# Patient Record
Sex: FEMALE | Race: WHITE | NOT HISPANIC OR LATINO | URBAN - METROPOLITAN AREA
[De-identification: names, ages, dates, MRNs, and addresses within clinical notes are randomized per-mention and may not be internally consistent; named-entity substitution may affect disease eponyms.]

---

## 2024-05-22 ENCOUNTER — PREP FOR PROCEDURE (OUTPATIENT)
Age: 34
End: 2024-05-22

## 2024-05-22 ENCOUNTER — TELEPHONE (OUTPATIENT)
Age: 34
End: 2024-05-22

## 2024-05-22 ENCOUNTER — CONSULT (OUTPATIENT)
Age: 34
End: 2024-05-22
Payer: COMMERCIAL

## 2024-05-22 VITALS
HEIGHT: 63 IN | BODY MASS INDEX: 33.73 KG/M2 | DIASTOLIC BLOOD PRESSURE: 80 MMHG | SYSTOLIC BLOOD PRESSURE: 138 MMHG | WEIGHT: 190.4 LBS

## 2024-05-22 DIAGNOSIS — R19.7 DIARRHEA, UNSPECIFIED TYPE: Primary | ICD-10-CM

## 2024-05-22 DIAGNOSIS — F41.9 ANXIETY: ICD-10-CM

## 2024-05-22 DIAGNOSIS — K90.0 CELIAC DISEASE: ICD-10-CM

## 2024-05-22 PROCEDURE — 99203 OFFICE O/P NEW LOW 30 MIN: CPT | Performed by: INTERNAL MEDICINE

## 2024-05-22 RX ORDER — PERFLUOROHEXYLOCTANE 1 MG/MG
SOLUTION OPHTHALMIC
COMMUNITY

## 2024-05-22 RX ORDER — DROSPIRENONE AND ETHINYL ESTRADIOL 0.03MG-3MG
KIT ORAL
COMMUNITY

## 2024-05-22 RX ORDER — ESCITALOPRAM OXALATE 20 MG/1
TABLET, FILM COATED ORAL
COMMUNITY

## 2024-05-22 NOTE — PATIENT INSTRUCTIONS
Joanna Jaramillo 33 y.o. female MRN: [id number]  Encounter: [id number]    ASSESSMENT AND PLAN:      1. Diarrhea, unspecified type  --With a history of chronic diarrhea.  Will have anywhere from 3-5 loose bowel movements per day.  No nocturnal symptoms.  Has significant urgency and crampy abdominal pain.  Does not seem to have IBS.  Seems to have more issues with chronic diarrhea.  Differential diagnosis could include relationship to celiac disease although the patient has been gluten-free for over 10 years.  -Consider microscopic colitis which is associated with celiac disease.  Less likely colon neoplasm or inflammatory bowel disease    - Colonoscopy; Future  ..This procedure has been fully reviewed with the patient and written informed consent has been obtained.     -No harm in the patient taking loperamide 2 mg 2 tablets in the morning.  Can titrate dose.  Probably no adverse effect    2. Celiac disease  --She was diagnosed in 2010 with endoscopic biopsies.  She also had equivocal blood work at that time.  Has not had endoscopy since that time.  She has been very compliant with a gluten-free diet.  PCP is ordered multiple times including a celiac panel    - EGD; Future  ..This procedure has been fully reviewed with the patient and written informed consent has been obtained.     -- Patient's PCP Dr. Bryson has ordered all the appropriate lab work including celiac panel, iron panel folate, CMP CBC thyroid studies and vitamin D level  -May include DEXA scan in the future    3. Anxiety  --Patient on low-dose Lexapro for history of anxiety.  Recent increase in dose but this was only the past week.  Medication has been associated with microscopic colitis      Followup Appointment: 3 mo to 4 mo

## 2024-05-22 NOTE — PROGRESS NOTES
[facility] - Outpatient Consultation  Joanna Jaramillo 33 y.o. female MRN: [id number]  Encounter: [id number]    ASSESSMENT AND PLAN:      1. Diarrhea, unspecified type  --With a history of chronic diarrhea.  Will have anywhere from 3-5 loose bowel movements per day.  No nocturnal symptoms.  Has significant urgency and crampy abdominal pain.  Does not seem to have IBS.  Seems to have more issues with chronic diarrhea.  Differential diagnosis could include relationship to celiac disease although the patient has been gluten-free for over 10 years.  -Consider microscopic colitis which is associated with celiac disease.  Less likely colon neoplasm or inflammatory bowel disease    - Colonoscopy; Future  ..This procedure has been fully reviewed with the patient and written informed consent has been obtained.     -No harm in the patient taking loperamide 2 mg 2 tablets in the morning.  Can titrate dose.  Probably no adverse effect    2. Celiac disease  --She was diagnosed in 2010 with endoscopic biopsies.  She also had equivocal blood work at that time.  Has not had endoscopy since that time.  She has been very compliant with a gluten-free diet.  PCP is ordered multiple times including a celiac panel    - EGD; Future  ..This procedure has been fully reviewed with the patient and written informed consent has been obtained.     -- Patient's PCP Dr. Bryson has ordered all the appropriate lab work including celiac panel, iron panel folate, CMP CBC thyroid studies and vitamin D level  -May include DEXA scan in the future    3. Anxiety  --Patient on low-dose Lexapro for history of anxiety.  Recent increase in dose but this was only the past week.  Medication has been associated with microscopic colitis      Followup Appointment: 3 mo to 4 mo   ______________________________________________________________________    Chief Complaint   Patient presents with    GI Consult     Pt was diagnosed  with Celiac disease 2010, symptoms have increased significantly over last 3 years. Effecting her daily life.       HPI:   Joanna Jaramillo is a 33 y.o. year old female who presents to the office with an ongoing issue with chronic loose stool.  Patient will have anywhere from 3 to 5 loose bowel movements per day.  This has been going on for about 2 years.  Patient has some crampy abdominal pain associated with urgency.  She does not have any blood per rectum or nocturnal symptoms.  Is no recent weight loss.  Recently patient is cut back on coffee and lactose containing foods and this seems to have helped but have not major major impact.  She was diagnosed with celiac disease in 2010 by virtue of abnormal blood work and an upper endoscopy and small bowel biopsy.  Patient has been very strict with respect to following a gluten-free diet.  She has been very compliant and really had no problems for years but then over the last 2 years has developed some increasing problems.  It should be noted there is no family history of colorectal cancer.  There is no history of inflammatory bowel disease.  Patient is not really having any extraintestinal manifestations with respect to her celiac disease at this time.  She does take Lexapro for history of anxiety.  The dose was recently increased.  She has been on this for about 2 to 3 years.      Historical Information   Past Medical History:   Diagnosis Date    Anxiety     Celiac disease Feb 2010     Past Surgical History:   Procedure Laterality Date    EYE SURGERY      UPPER GASTROINTESTINAL ENDOSCOPY  Feb 2010     Social History     Substance and Sexual Activity   Alcohol Use Yes    Alcohol/week: 5.0 standard drinks of alcohol    Types: 5 Standard drinks or equivalent per week     Social History     Substance and Sexual Activity   Drug Use Never     Social History     Tobacco Use   Smoking Status Never    Passive exposure: Never   Smokeless Tobacco Never     Family History  "  Problem Relation Age of Onset    Cancer Mother     Diabetes Father     Breast cancer Maternal Grandmother     Colon cancer Neg Hx     Colon polyps Neg Hx        Meds/Allergies     Current Outpatient Medications:     drospirenone-ethinyl estradiol (Ocella) 3-0.03 MG per tablet    Lexapro 20 MG tablet    Miebo 1.338 GM/ML SOLN    Allergies   Allergen Reactions    Gluten Meal - Food Allergy Other (See Comments)       PHYSICAL EXAM:    Blood pressure 138/80, height 5' 3\" (1.6 m), weight 86.4 kg (190 lb 6.4 oz). Body mass index is 33.73 kg/m².  General Appearance: NAD, cooperative, alert  Eyes: Anicteric, conjunctiva pink   ENT:  Normocephalic, atraumatic, normal mucosa.    Neck:  Supple, symmetrical, trachea midline,   Resp:  Clear to auscultation bilaterally; no rales, rhonchi or wheezing; respirations unlabored   CV:  S1 S2, Regular rate and rhythm; no murmur, rub, or gallop.  GI:  Soft, non-tender, non-distended; normal bowel sounds; no masses, no organomegaly   Rectal: Deferred  Musculoskeletal: No cyanosis, clubbing or edema. Normal ROM.  Skin:  No jaundice, rashes, or lesions   Heme/Lymph: No palpable cervical lymphadenopathy  Psych: Normal affect, good eye contact  Neuro: No gross deficits, AAOx3    Lab Results:   Pending studies includ CBC CMP celiac panel, vitamin D iron levels folate and vitamin B12      REVIEW OF SYSTEMS:    CONSTITUTIONAL: Denies any fever, chills, rigors, and weight loss.  HEENT: No earache or tinnitus. Denies hearing loss or visual disturbances.  CARDIOVASCULAR: No chest pain or palpitations.   RESPIRATORY: Denies any cough, hemoptysis, shortness of breath or dyspnea on exertion.  GASTROINTESTINAL: As noted in the History of Present Illness.   GENITOURINARY: No problems with urination. Denies any hematuria or dysuria.  NEUROLOGIC: No dizziness or vertigo, denies headaches.   MUSCULOSKELETAL: Denies any muscle or joint pain.   SKIN: Denies skin rashes or itching.   ENDOCRINE: Denies " excessive thirst. Denies intolerance to heat or cold.  PSYCHOSOCIAL: Positive for mild anxiety . Denies any recent memory loss.

## 2024-05-22 NOTE — TELEPHONE ENCOUNTER
Scheduled date of combo (as of today): 7/29/2024  Physician performing combo: ROLANDA  Location of combo: [facility]  Bowel prep reviewed with patient: Miralax  Instructions reviewed with patient by: LUIS  Clearances: N

## 2024-07-15 ENCOUNTER — ANESTHESIA EVENT (OUTPATIENT)
Dept: ANESTHESIOLOGY | Facility: AMBULATORY SURGERY CENTER | Age: 34
End: 2024-07-15

## 2024-07-15 ENCOUNTER — ANESTHESIA (OUTPATIENT)
Dept: ANESTHESIOLOGY | Facility: AMBULATORY SURGERY CENTER | Age: 34
End: 2024-07-15

## 2024-07-29 ENCOUNTER — HOSPITAL ENCOUNTER (OUTPATIENT)
Dept: GASTROENTEROLOGY | Facility: AMBULATORY SURGERY CENTER | Age: 34
Discharge: HOME | End: 2024-07-29
Payer: COMMERCIAL

## 2024-07-29 ENCOUNTER — ANESTHESIA (OUTPATIENT)
Dept: GASTROENTEROLOGY | Facility: AMBULATORY SURGERY CENTER | Age: 34
End: 2024-07-29

## 2024-07-29 ENCOUNTER — ANESTHESIA EVENT (OUTPATIENT)
Dept: GASTROENTEROLOGY | Facility: AMBULATORY SURGERY CENTER | Age: 34
End: 2024-07-29

## 2024-07-29 VITALS
TEMPERATURE: 97.8 F | WEIGHT: 190 LBS | SYSTOLIC BLOOD PRESSURE: 121 MMHG | RESPIRATION RATE: 19 BRPM | BODY MASS INDEX: 33.66 KG/M2 | DIASTOLIC BLOOD PRESSURE: 83 MMHG | OXYGEN SATURATION: 99 % | HEART RATE: 70 BPM | HEIGHT: 63 IN

## 2024-07-29 DIAGNOSIS — K90.0 CELIAC DISEASE: ICD-10-CM

## 2024-07-29 DIAGNOSIS — R19.7 DIARRHEA, UNSPECIFIED TYPE: ICD-10-CM

## 2024-07-29 LAB
EXT PREGNANCY TEST URINE: NEGATIVE
EXT. CONTROL: NORMAL

## 2024-07-29 PROCEDURE — 45380 COLONOSCOPY AND BIOPSY: CPT | Performed by: INTERNAL MEDICINE

## 2024-07-29 PROCEDURE — 43239 EGD BIOPSY SINGLE/MULTIPLE: CPT | Performed by: INTERNAL MEDICINE

## 2024-07-29 PROCEDURE — 88305 TISSUE EXAM BY PATHOLOGIST: CPT | Performed by: PATHOLOGY

## 2024-07-29 PROCEDURE — 88342 IMHCHEM/IMCYTCHM 1ST ANTB: CPT | Performed by: PATHOLOGY

## 2024-07-29 RX ORDER — SODIUM CHLORIDE, SODIUM LACTATE, POTASSIUM CHLORIDE, CALCIUM CHLORIDE 600; 310; 30; 20 MG/100ML; MG/100ML; MG/100ML; MG/100ML
50 INJECTION, SOLUTION INTRAVENOUS CONTINUOUS
Status: DISCONTINUED | OUTPATIENT
Start: 2024-07-29 | End: 2024-08-02 | Stop reason: HOSPADM

## 2024-07-29 RX ORDER — PROPOFOL 10 MG/ML
INJECTION, EMULSION INTRAVENOUS AS NEEDED
Status: DISCONTINUED | OUTPATIENT
Start: 2024-07-29 | End: 2024-07-29

## 2024-07-29 RX ORDER — LIDOCAINE HYDROCHLORIDE 10 MG/ML
INJECTION, SOLUTION EPIDURAL; INFILTRATION; INTRACAUDAL; PERINEURAL AS NEEDED
Status: DISCONTINUED | OUTPATIENT
Start: 2024-07-29 | End: 2024-07-29

## 2024-07-29 RX ADMIN — LIDOCAINE HYDROCHLORIDE 50 MG: 10 INJECTION, SOLUTION EPIDURAL; INFILTRATION; INTRACAUDAL; PERINEURAL at 10:44

## 2024-07-29 RX ADMIN — PROPOFOL 50 MG: 10 INJECTION, EMULSION INTRAVENOUS at 10:59

## 2024-07-29 RX ADMIN — PROPOFOL 50 MG: 10 INJECTION, EMULSION INTRAVENOUS at 11:04

## 2024-07-29 RX ADMIN — PROPOFOL 50 MG: 10 INJECTION, EMULSION INTRAVENOUS at 11:07

## 2024-07-29 RX ADMIN — SODIUM CHLORIDE, SODIUM LACTATE, POTASSIUM CHLORIDE, CALCIUM CHLORIDE 50 ML/HR: 600; 310; 30; 20 INJECTION, SOLUTION INTRAVENOUS at 10:17

## 2024-07-29 RX ADMIN — PROPOFOL 50 MG: 10 INJECTION, EMULSION INTRAVENOUS at 10:54

## 2024-07-29 RX ADMIN — PROPOFOL 50 MG: 10 INJECTION, EMULSION INTRAVENOUS at 10:49

## 2024-07-29 RX ADMIN — PROPOFOL 150 MG: 10 INJECTION, EMULSION INTRAVENOUS at 10:44

## 2024-07-29 NOTE — H&P
"History and Physical -  [facility]  Joanna Jaramillo 33 y.o. female MRN: [id number]    HPI: Joanna Jaramillo is a 33 y.o. year old female who presents for EGD and colonoscopy.  Patient does have a history of celiac disease diagnosed over 10 years ago.  Also with intractable loose stools and stool frequency    REVIEW OF SYSTEMS: Per the HPI, and otherwise unremarkable.    Historical Information   Past Medical History:   Diagnosis Date    Anxiety     Celiac disease Feb 2010     Past Surgical History:   Procedure Laterality Date    EYE SURGERY      UPPER GASTROINTESTINAL ENDOSCOPY  Feb 2010     Social History   Social History     Substance and Sexual Activity   Alcohol Use Yes    Alcohol/week: 5.0 standard drinks of alcohol    Types: 5 Standard drinks or equivalent per week     Social History     Substance and Sexual Activity   Drug Use Never     Social History     Tobacco Use   Smoking Status Never    Passive exposure: Never   Smokeless Tobacco Never     Family History   Problem Relation Age of Onset    Cancer Mother     Diabetes Father     Breast cancer Maternal Grandmother     Colon cancer Neg Hx     Colon polyps Neg Hx        Meds/Allergies       Current Outpatient Medications:     drospirenone-ethinyl estradiol (Ocella) 3-0.03 MG per tablet    Miebo 1.338 GM/ML SOLN    Lexapro 20 MG tablet    Current Facility-Administered Medications:     lactated ringers infusion, 50 mL/hr, Intravenous, Continuous, 50 mL/hr at 07/29/24 1017    Allergies   Allergen Reactions    Gluten Meal - Food Allergy GI Intolerance       Objective     /73   Pulse 83   Temp 97.8 °F (36.6 °C) (Temporal)   Resp (!) 23   Ht 5' 3\" (1.6 m)   Wt 86.2 kg (190 lb)   LMP 07/22/2024   SpO2 98%   BMI 33.66 kg/m²     PHYSICAL EXAM    Gen: NAD AAOx3  Head: Normocephalic, Atraumatic  CV: S1S2 RRR no m/r/g  CHEST: Clear b/l no c/r/w  ABD: soft, +BS NT/ND  EXT: no edema    ASSESSMENT/PLAN:  This is a 33 y.o. year old female " here for EGD and colonoscopy, and she is stable and optimized for her procedure.

## 2024-07-29 NOTE — ANESTHESIA PREPROCEDURE EVALUATION
Procedure:  EGD  COLONOSCOPY    Relevant Problems   ANESTHESIA (within normal limits)      NEURO/PSYCH   (+) Anxiety        Physical Exam    Airway    Mallampati score: I  TM Distance: >3 FB  Neck ROM: full     Dental   No notable dental hx     Cardiovascular  Cardiovascular exam normal    Pulmonary  Pulmonary exam normal     Other Findings  post-pubertal.      Anesthesia Plan  ASA Score- 2     Anesthesia Type- IV sedation with anesthesia with ASA Monitors.         Additional Monitors:     Airway Plan:     Comment: I discussed risks (reviewed with patient on the anesthesia consent form), benefits and alternatives of monitored sedation including the possibility under sedation to have recall or mild discomfort.  .       Plan Factors-    Chart reviewed.    Patient summary reviewed.                  Induction- intravenous.    Postoperative Plan-         Informed Consent- Anesthetic plan and risks discussed with patient.  I personally reviewed this patient with the CRNA. Discussed and agreed on the Anesthesia Plan with the CRNA..

## 2024-07-29 NOTE — ANESTHESIA POSTPROCEDURE EVALUATION
Post-Op Assessment Note    CV Status:  Stable  Pain Score: 0    Pain management: adequate       Mental Status:  Alert and awake   Hydration Status:  Euvolemic   PONV Controlled:  None   Airway Patency:  Patent     Post Op Vitals Reviewed: Yes    No anethesia notable event occurred.    Staff: Anesthesiologist, CRNA               BP   103/63   Temp      Pulse  65   Resp   16   SpO2   96

## 2024-08-03 NOTE — RESULT ENCOUNTER NOTE
Message left on MyChart.  Patient's endoscopic biopsies EGD and colonoscopy all negative.  Imodium as needed for constipation.  Copy passed to patient's PCP Dr. Bryson

## 2024-09-10 ENCOUNTER — OFFICE VISIT (OUTPATIENT)
Age: 34
End: 2024-09-10
Payer: COMMERCIAL

## 2024-09-10 VITALS
HEIGHT: 63 IN | BODY MASS INDEX: 34.12 KG/M2 | SYSTOLIC BLOOD PRESSURE: 118 MMHG | DIASTOLIC BLOOD PRESSURE: 82 MMHG | WEIGHT: 192.6 LBS

## 2024-09-10 DIAGNOSIS — R19.7 DIARRHEA, UNSPECIFIED TYPE: ICD-10-CM

## 2024-09-10 DIAGNOSIS — K58.0 IRRITABLE BOWEL SYNDROME WITH DIARRHEA: ICD-10-CM

## 2024-09-10 DIAGNOSIS — K90.0 CELIAC DISEASE: Primary | ICD-10-CM

## 2024-09-10 PROCEDURE — 99214 OFFICE O/P EST MOD 30 MIN: CPT | Performed by: INTERNAL MEDICINE

## 2024-09-10 RX ORDER — DICYCLOMINE HYDROCHLORIDE 10 MG/1
10 CAPSULE ORAL
Qty: 90 CAPSULE | Refills: 2 | Status: SHIPPED | OUTPATIENT
Start: 2024-09-10 | End: 2024-12-09

## 2024-09-10 RX ORDER — LOPERAMIDE HYDROCHLORIDE 2 MG/1
2 TABLET ORAL 4 TIMES DAILY PRN
Qty: 120 TABLET | Refills: 5 | Status: SHIPPED | OUTPATIENT
Start: 2024-09-10 | End: 2025-03-09

## 2024-09-10 NOTE — PROGRESS NOTES
[facility] - Outpatient Follow-up Note  Joanna Jaramillo 33 y.o. female MRN: [id number]  Encounter: [id number]    ASSESSMENT AND PLAN:      1. Celiac disease  Adhere to a gluten-free diet.  Most recent EGD this summer revealed normal-appearing duodenum normal villous structure.  -Exacerbation of celiac not responsible for patient's diarrhea.  Continue gluten-free diet    -Note iron panel shows low iron saturation.  Vitamin D level is low-- 23 ng per ml   -Advised supplementation for vitamin D- 800 IU's daily and 1250 mg of calcium carbonate per day  - Consider iron supplementation - ferrous sulfate 325 mg every other day    2. Diarrhea, unspecified type  --Patient with about 4 bowel movements a day.  Does not have nocturnal symptoms.  Colonoscopy, ileoscopy and right colon biopsies negative for visible or microscopic colitis.  -Try to manage symptoms    -Could consider cholestyramine or Colestid.  If loperamide and Bentyl not effective      3. Irritable bowel syndrome with diarrhea  -Patient has been avoiding coffee.  No clear dietary triggers.  Lets treat patient for IBS-D.  Ordinarily would like to give the patient amitriptyline and nortriptyline for IBS-D however significant drug interactions with Lexapro  -Patient should take loperamide 2 mg 2 tablets in the morning before breakfast or meal and then repeat as necessary later in the day either 1 or 2 tablets before meal  - loperamide (IMODIUM A-D) 2 MG tablet; Take 1 tablet (2 mg total) by mouth 4 (four) times a day as needed for diarrhea  Dispense: 120 tablet; Refill: 5    - dicyclomine (BENTYL) 10 mg capsule; Take 1 capsule (10 mg total) by mouth 3 (three) times a day before meals  Dispense: 90 capsule; Refill: 2  -Can take twice a day.  Difficult to take a medication 3 times per day.  May cause dry mouth.  Less often causes dizziness    Followup Appointment:  4 mo    ______________________________________________________________________    Chief Complaint   Patient presents with    Follow-up     Pt still experiencing diarrhea, slightly improved over the last month.     HPI: Patient returns to the office for follow-up with respect to ongoing problems with diarrhea.  Patient was seen in May for ongoing issues with diarrhea and having ongoing from 3-5 loose bowel movements per day.  Workup included CT and colonoscopy.  Small bowel biopsy was negative for any active celiac disease and this demonstrated normal villous pattern.  Colonoscopy and ileoscopy were normal.  No microscopic colitis or visible colitis on biopsy.  Terminal ileum appeared normal he is doing a little bit better.  No particular triggers with respect to her loose stools.  She does have crampy abdominal pain which is relieved with a bowel movement.  She does not feel she is under undue stress.  She is on Lexapro for anxiety and her dose has been recently increased from 10 mg a day to 20 mg/day    Historical Information   Past Medical History:   Diagnosis Date    Anxiety     Celiac disease Feb 2010     Past Surgical History:   Procedure Laterality Date    EYE SURGERY      UPPER GASTROINTESTINAL ENDOSCOPY  Feb 2010     Social History     Substance and Sexual Activity   Alcohol Use Yes    Alcohol/week: 5.0 standard drinks of alcohol    Types: 5 Standard drinks or equivalent per week     Social History     Substance and Sexual Activity   Drug Use Never     Social History     Tobacco Use   Smoking Status Never    Passive exposure: Never   Smokeless Tobacco Never     Family History   Problem Relation Age of Onset    Cancer Mother     Diabetes Father     Breast cancer Maternal Grandmother     Colon cancer Neg Hx     Colon polyps Neg Hx          Current Outpatient Medications:     dicyclomine (BENTYL) 10 mg capsule    drospirenone-ethinyl estradiol (Ocella) 3-0.03 MG per tablet    loperamide (IMODIUM A-D) 2 MG tablet     "Miebo 1.338 GM/ML SOLN    Lexapro 20 MG tablet  Allergies   Allergen Reactions    Gluten Meal - Food Allergy GI Intolerance     Reviewed medications and allergies and updated as indicated    PHYSICAL EXAM:    Blood pressure 118/82, height 5' 3\" (1.6 m), weight 87.4 kg (192 lb 9.6 oz). Body mass index is 34.12 kg/m².  General Appearance: NAD, cooperative, alert  Eyes: Anicteric, conjunctiva pink  ENT:  Normocephalic, atraumatic, normal mucosa.    Neck:  Supple, symmetrical, trachea midline  Resp:  Clear to auscultation bilaterally; no rales, rhonchi or wheezing; respirations unlabored   CV:  S1 S2, Regular rate and rhythm; no murmur, rub, or gallop.  GI:  Soft, non-tender, non-distended; normal bowel sounds; no masses, no organomegaly   Rectal: Deferred  Musculoskeletal: No cyanosis, clubbing or edema. Normal ROM.  Skin:  No jaundice, rashes, or lesions   Heme/Lymph: No palpable cervical lymphadenopathy  Psych: Normal affect, good eye contact  Neuro: No gross deficits, AAOx3    Lab Results:   No results found for: \"WBC\", \"HGB\", \"HCT\", \"MCV\", \"PLT\"  No results found for: \"NA\", \"K\", \"CL\", \"CO2\", \"ANIONGAP\", \"BUN\", \"CREATININE\", \"GLUCOSE\", \"GLUF\", \"CALCIUM\", \"CORRECTEDCA\", \"AST\", \"ALT\", \"ALKPHOS\", \"PROT\", \"BILITOT\", \"EGFR\"    Radiology Results:   No results found.    "

## 2024-09-10 NOTE — PATIENT INSTRUCTIONS
[facility] - Outpatient Follow-up Note  Joanna Jaramillo 33 y.o. female MRN: [id number]  Encounter: [id number]    ASSESSMENT AND PLAN:      1. Celiac disease  Adhere to a gluten-free diet.  Most recent EGD this summer revealed normal-appearing duodenum normal villous structure.  -Exacerbation of celiac not responsible for patient's diarrhea.  Continue gluten-free diet      -Note iron panel shows low iron saturation.  Vitamin D level is low-- 23 ng per ml   -Advised supplementation for vitamin D- 800 IU's daily and 1250 mg of calcium carbonate per day  - Consider iron supplementation - ferrous sulfate 325 mg every other day    2. Diarrhea, unspecified type  --Patient with about 4 bowel movements a day.  Does not have nocturnal symptoms.  Colonoscopy, ileoscopy and right colon biopsies negative for visible or microscopic colitis.  -Try to manage symptoms    -Could consider cholestyramine or Colestid.  If loperamide and Bentyl not effective      3. Irritable bowel syndrome with diarrhea  -Patient has been avoiding coffee.  No clear dietary triggers.  Lets treat patient for IBS-D.  Ordinarily would like to give the patient amitriptyline and nortriptyline for IBS-D however significant drug interactions with Lexapro  -Patient should take loperamide 2 mg 2 tablets in the morning before breakfast or meal and then repeat as necessary later in the day either 1 or 2 tablets before meal  - loperamide (IMODIUM A-D) 2 MG tablet; Take 1 tablet (2 mg total) by mouth 4 (four) times a day as needed for diarrhea  Dispense: 120 tablet; Refill: 5    - dicyclomine (BENTYL) 10 mg capsule; Take 1 capsule (10 mg total) by mouth 3 (three) times a day before meals  Dispense: 90 capsule; Refill: 2  -Can take twice a day.  Difficult to take a medication 3 times per day.  May cause dry mouth.  Less often causes dizziness    Followup Appointment:  4 mo

## 2024-10-03 DIAGNOSIS — R19.7 DIARRHEA, UNSPECIFIED TYPE: ICD-10-CM

## 2024-10-03 RX ORDER — DICYCLOMINE HYDROCHLORIDE 10 MG/1
CAPSULE ORAL
Qty: 270 CAPSULE | Refills: 1 | Status: SHIPPED | OUTPATIENT
Start: 2024-10-03

## 2024-12-20 ENCOUNTER — TELEPHONE (OUTPATIENT)
Dept: GASTROENTEROLOGY | Facility: CLINIC | Age: 34
End: 2024-12-20

## 2024-12-20 NOTE — TELEPHONE ENCOUNTER
12/20/24 - Left a VM for the patient, due to a change in the provider tiffany we need to reschedule appt- DS

## 2025-08-04 ENCOUNTER — TELEPHONE (OUTPATIENT)
Age: 35
End: 2025-08-04